# Patient Record
(demographics unavailable — no encounter records)

---

## 2024-10-17 NOTE — HISTORY OF PRESENT ILLNESS
[FreeTextEntry1] : Huyen is a healthy 67 year old female here for follow up.  I last saw her in 3/22. At that time, she has found her blood pressure to be elevated. Her blood pressure was in the 130's prior to the visit. She has been compliant with her Benicar.  Overall, she has been feeling well.  She denies chest pain and palpitations. She also denies significant dizziness lightheadedness, or near syncope.  Echo with normal LV function in 2022 with normal LV function.  Exercise stress test without ischemia in 2022.  She is very active and always running around. She has no family history of CAD. Her LDL went up to 140. She has been eating a lot cheese.

## 2024-10-17 NOTE — DISCUSSION/SUMMARY
[With Me] : with me [___ Month(s)] : in [unfilled] month(s) [FreeTextEntry1] : Huyen is a healthy 67 year old female here for evaluation.  Her BP has been much better controlled, despite an elevated number here. She will remain on Benicar 20, and we will keep an eye on things. She seems to have a significant reactive component to her pressure.   Echocardiogram and stress test were unremarkable in 2022. Her most recent LDL was in the 140s. She is not thrilled about the idea of a statin, though is willing to get a calcium score for further risk stratification, and if elevated, she will comply.  She will change her diet (cut her cheese and egg intake), and get back to more exercise.  If no issues, I will see her again in 6 months. [EKG obtained to assist in diagnosis and management of assessed problem(s)] : EKG obtained to assist in diagnosis and management of assessed problem(s)

## 2025-06-03 NOTE — PHYSICAL EXAM
[No Acute Distress] : no acute distress [Well-Appearing] : well-appearing [Normal Sclera/Conjunctiva] : normal sclera/conjunctiva [PERRL] : pupils equal round and reactive to light [Normal Outer Ear/Nose] : the outer ears and nose were normal in appearance [Normal Oropharynx] : the oropharynx was normal [Normal TMs] : both tympanic membranes were normal [No Lymphadenopathy] : no lymphadenopathy [Supple] : supple [No Respiratory Distress] : no respiratory distress  [No Accessory Muscle Use] : no accessory muscle use [Normal Rate] : normal rate  [Regular Rhythm] : with a regular rhythm [de-identified] : + diffuse wheeze and course sounds